# Patient Record
Sex: FEMALE | Race: WHITE | HISPANIC OR LATINO | Employment: UNEMPLOYED
[De-identification: names, ages, dates, MRNs, and addresses within clinical notes are randomized per-mention and may not be internally consistent; named-entity substitution may affect disease eponyms.]

---

## 2024-03-06 ENCOUNTER — TRANSCRIBE ORDERS (OUTPATIENT)
Dept: OTHER | Age: 44
End: 2024-03-06

## 2024-03-06 ENCOUNTER — PRE VISIT (OUTPATIENT)
Dept: OTHER | Age: 44
End: 2024-03-06

## 2024-03-06 ENCOUNTER — PATIENT OUTREACH (OUTPATIENT)
Dept: ONCOLOGY | Facility: CLINIC | Age: 44
End: 2024-03-06
Payer: COMMERCIAL

## 2024-03-06 DIAGNOSIS — N83.8 OVARIAN MASS: Primary | ICD-10-CM

## 2024-03-06 NOTE — TELEPHONE ENCOUNTER
RECORDS STATUS - ALL OTHER DIAGNOSIS      Action March 6, 2024 12:44 PM    Action Taken  Request is faxed to  and [facility] to push images to  PACS.      Imaging Received  March 6, 2024 1:23 PM    Action: Images from  received and resolved to PACS.     Imaging Received  March 6, 2024 3:08 PM    Action: Images from [facility] received and resolved to PACS.     RECORDS RECEIVED FROM: /[facility]   DATE RECEIVED:    NOTES STATUS DETAILS   OFFICE NOTE from referring provider [facility] 2/5/24: Dr. Merissa Patel   OFFICE NOTE from other specialist [facility] 9/21/21: Dr. Kadeem Prado   DISCHARGE REPORT from the ER -  2/29/24: [facility]   OPERATIVE REPORT [facility] 6/14/21:Hysterectomy    MEDICATION LIST [facility]    LABS     PATHOLOGY REPORTS Report in [facility]  6/14/21: [id number] (negative)   ANYTHING RELATED TO DIAGNOSIS CE-  Most recent 3/5/24   IMAGING (NEED IMAGES & REPORT)     CT SCANS (Img req from [facility]) [facility]:  2/29/24: CT Abd Pel    [facility]:  8/11/22, 8/9/22, 4/15/22, 9/1/21, 8/15/21, 6/30/21, 6/22/21, 6/16/21: CT Abd Pel  1/16/21: CT Chest Abd Pel   ULTRASOUND (Img req from [facility]) [facility]:  2/29/24: US Pel    [facility]:  8/11/22, 9/22/21, 3/1/21: US Pel

## 2024-03-06 NOTE — PROGRESS NOTES
"New Patient Hematology / Oncology Nurse Navigator Note     Referral Date: 3/6/24    Referring provider:   Merissa Patel  [place] [address]   Phone: [phone]   Fax: [phone]     Referring Clinic/Organization: [facility]      Referred to: GynOnc    Requested provider (if applicable): First available - did not specify     Evaluation for : complex ovarian mass with h/o of uterine reimplantation       Clinical History (per Nurse review of records provided):    Office Visit with OBGYN at [facility]:  \"A/P- complex left ovarian mass with history of ureter reimplantation that abuts this mass. Patient also has some concerning features on imaging and may be best served by having evaluation and possible surgical intervention by gyn Oncology. Will send patient for tumor markers and referral was placed. Patient has insurance pending at this time which will be successful on April 1st. No clinical evidence of torsion and patient is functioning fine with minimal decrease her quality of life at this time. Long discussion with patient with help of an in-person  discussing the findings on imaging and the potential surgery. \" -- BOOKMARKED  2/29/24 CT Abd/Pelvis:  IMPRESSION:   1.  Cystic mass in the left pelvis may be ovarian in nature. Recommend further evaluation with pelvic ultrasound.   2.  Left ovary is small and lobular with scarring noted. No current hydronephrosis. No renal or ureteral stones.  -- BOOKMARKED  2/29/24 Tumor Markers ( normal at 21)--BOOKMARKED   Pelvic US:  IMPRESSION:    Complex 7.7 cm multilocular cystic lesion in the left ovary with thickened internal septations. Surgical consultation recommended.  -- BOOKMARKED    Clinical Assessment / Barriers to Care (Per Nurse):  Pt lives in [place], [place]. Requires . Per RMD note, patient has insurance pending at this time which will be successful on April 1st.    Records Location: Care " Everywhere     Records Needed:   Records from  per protocol     Additional testing needed prior to consult:   N/A    Referral updates and Plan:   Referral received, chart reviewed by nursing, scheduling instructions updated, patient contacted with  and scheduled with Dr. Drake 4/2/24 (as insurance is effective 4/1).     Akua Alcantar, BSN, RN, PHN, OCN  Hematology/Oncology Nurse Navigator  [facility]  [phone]

## 2024-04-02 ENCOUNTER — ONCOLOGY VISIT (OUTPATIENT)
Dept: ONCOLOGY | Facility: CLINIC | Age: 44
End: 2024-04-02
Attending: OBSTETRICS & GYNECOLOGY
Payer: COMMERCIAL

## 2024-04-02 VITALS
WEIGHT: 153 LBS | RESPIRATION RATE: 16 BRPM | OXYGEN SATURATION: 98 % | HEART RATE: 73 BPM | TEMPERATURE: 98.4 F | SYSTOLIC BLOOD PRESSURE: 138 MMHG | HEIGHT: 60 IN | BODY MASS INDEX: 30.04 KG/M2 | DIASTOLIC BLOOD PRESSURE: 85 MMHG

## 2024-04-02 DIAGNOSIS — N83.8 OVARIAN MASS: Primary | ICD-10-CM

## 2024-04-02 PROCEDURE — G0463 HOSPITAL OUTPT CLINIC VISIT: HCPCS | Performed by: OBSTETRICS & GYNECOLOGY

## 2024-04-02 PROCEDURE — 99204 OFFICE O/P NEW MOD 45 MIN: CPT | Performed by: OBSTETRICS & GYNECOLOGY

## 2024-04-02 RX ORDER — ALBUTEROL SULFATE 90 UG/1
1-2 AEROSOL, METERED RESPIRATORY (INHALATION)
COMMUNITY
Start: 2023-11-14 | End: 2024-11-13

## 2024-04-02 RX ORDER — IBUPROFEN 600 MG/1
600 TABLET, FILM COATED ORAL
COMMUNITY
Start: 2024-02-29

## 2024-04-02 RX ORDER — BISMUTH SUBSALICYLATE 262MG/15ML
524 SUSPENSION, ORAL (FINAL DOSE FORM) ORAL
COMMUNITY

## 2024-04-02 ASSESSMENT — PAIN SCALES - GENERAL: PAINLEVEL: MILD PAIN (2)

## 2024-04-02 NOTE — NURSING NOTE
"Oncology Rooming Note    April 2, 2024 1:09 PM   Bonnie Duong is a 43 year old female who presents for:    Chief Complaint   Patient presents with    Oncology Clinic Visit     RTN for Ovarian MAss     Initial Vitals: /85   Pulse 73   Temp 98.4  F (36.9  C) (Oral)   Resp 16   Ht 1.53 m (5' 0.24\")   Wt 69.4 kg (153 lb)   SpO2 98%   BMI 29.65 kg/m   Estimated body mass index is 29.65 kg/m  as calculated from the following:    Height as of this encounter: 1.53 m (5' 0.24\").    Weight as of this encounter: 69.4 kg (153 lb). Body surface area is 1.72 meters squared.  Mild Pain (2) Comment: Data Unavailable   No LMP recorded.  Allergies reviewed: Yes  Medications reviewed: Yes    Medications: Medication refills not needed today.  Pharmacy name entered into EPIC: Data Unavailable    Frailty Screening:   Is the patient here for a new oncology consult visit in cancer care? 2. No      Clinical concerns: none       No Powell MA             "

## 2024-04-02 NOTE — LETTER
4/2/2024         RE: Bonnie Duong  [address]  [place] [address]        Dear Colleague,    Thank you for referring your patient, Bonnie Duong, to the [facility]. Please see a copy of my visit note below.      GYNECOLOGIC ONCOLOGY  [facility]  [address] [address]  Phone: [phone]  Fax: [phone]     Patient:  Bonnie Duong  YOB: 1980  Date of Visit: 04/02/2024    Referring Provider  Merissa Patel  [facility]    Reason for visit: ovarian cyst    Assessment   Bonnie Duong is a 43 year old pre-menopausal patient with history notable for supra-cervical hysterectomy for fibroids, history of distal ureteral resection now with ovarian cyst likely benign etiology.    Plan  - Review of imaging shows ovarian cyst in the left adnexa in 2022 which was very similar in appearance. It has increased in size since then but not significantly. We reviewed the options including surgical resection or observation. We cannot definitively make a diagnosis without surgery, but given normal tumor markers and reassuring findings on imaging with relative stability over the last 2 years I think surveillance is a reasonable option.   - She would like surveillance at this time. We will repeat pelvic ultrasound in 6-12 mos (whatever works with her schedule is fine). Can have imaging same day as appointment or virtual appointment to review results.   - If she develops new pelvic symptoms, she should be evaluated sooner.     Abdifatah Drake MD   Gynecologic Oncology     ~~~~~~~~~~~~~~~~~~~~~~~~~~~~~~~~~~~~~~~~~~~~~~~~~~~~~~~~~~~~~~~~~~~~~~~    History of Present Illness  Per chart review: history of supracervical hysterectomy for fibroids. Concurrently had distal ureteral resection with reimplantation due to fibrosis from kidney stones by urology. Ultrasound showing left multicystic ovary in  "2022, see below. Normal tumor markers.     Per patient report: Had acute pain and went to ED for evaluation. Pain was in upper left flank. Has not recurred. Notes a lot of stress because she is here alone - her family is in [place]. She is currently looking for employment so is concerned about finances and about any intervention that would prevent her from working.    Colombian  used for the entirety of the visit.     OB/Gynecologic History:  g0  S/p hysterectomy (supracervical)      No past medical history on file.  Past Surgical History:   Procedure Laterality Date     IR CYSTOGRAM  6/18/2021     History reviewed. No pertinent family history.       Physical Exam  /85   Pulse 73   Temp 98.4  F (36.9  C) (Oral)   Resp 16   Ht 1.53 m (5' 0.24\")   Wt 69.4 kg (153 lb)   SpO2 98%   BMI 29.65 kg/m    Gen: no acute distress    Data  Laboratory data and imaging listed below was reviewed prior to this encounter  Labs/Pathology:    2    AFP 2.7    21  Inhibin B 53    Imaging: Reviewed ultrasound images from 2024 and 2022. Both show a multi-cystic structure in the left adnexa with slightly thickened septation. There are no solid areas, papillary excresences or irregularities on the surface. The size has increased by about 2-3cm from 2022 to 2024 in at least one dimension.     I spent a total of 45 minutes on the care of Bonnie Duong on the day of service including face to face time, care coordination, and documentation on the day of service.   {AMBULATORY ATTESTATION (Optional):729102}         Again, thank you for allowing me to participate in the care of your patient.        Sincerely,        Abdifatah Drake MD  "

## 2024-04-03 NOTE — NURSING NOTE
Return appt in    Biopsies orders entered, specimen sent to pathology    pap smear done today, orders entered, specimen sent to cytology   orders entered  Referral to   Check out note/staff message sent to scheduling

## 2025-03-31 ENCOUNTER — ANCILLARY PROCEDURE (OUTPATIENT)
Dept: ULTRASOUND IMAGING | Facility: CLINIC | Age: 45
End: 2025-03-31
Attending: OBSTETRICS & GYNECOLOGY
Payer: COMMERCIAL

## 2025-03-31 DIAGNOSIS — N83.8 OVARIAN MASS: ICD-10-CM

## 2025-03-31 PROCEDURE — 76830 TRANSVAGINAL US NON-OB: CPT | Performed by: RADIOLOGY

## 2025-03-31 PROCEDURE — 76856 US EXAM PELVIC COMPLETE: CPT | Performed by: RADIOLOGY

## 2025-04-10 ENCOUNTER — APPOINTMENT (OUTPATIENT)
Dept: INTERPRETER SERVICES | Facility: CLINIC | Age: 45
End: 2025-04-10
Payer: COMMERCIAL

## 2025-06-29 ENCOUNTER — HEALTH MAINTENANCE LETTER (OUTPATIENT)
Age: 45
End: 2025-06-29

## 2025-07-08 ENCOUNTER — ONCOLOGY VISIT (OUTPATIENT)
Dept: ONCOLOGY | Facility: CLINIC | Age: 45
End: 2025-07-08
Attending: OBSTETRICS & GYNECOLOGY
Payer: COMMERCIAL

## 2025-07-08 VITALS
OXYGEN SATURATION: 98 % | TEMPERATURE: 97.9 F | SYSTOLIC BLOOD PRESSURE: 125 MMHG | BODY MASS INDEX: 26.7 KG/M2 | HEART RATE: 61 BPM | RESPIRATION RATE: 16 BRPM | DIASTOLIC BLOOD PRESSURE: 82 MMHG | WEIGHT: 137.8 LBS

## 2025-07-08 DIAGNOSIS — F32.A DEPRESSION: Primary | ICD-10-CM

## 2025-07-08 PROCEDURE — 99215 OFFICE O/P EST HI 40 MIN: CPT | Performed by: OBSTETRICS & GYNECOLOGY

## 2025-07-08 PROCEDURE — G0463 HOSPITAL OUTPT CLINIC VISIT: HCPCS | Performed by: OBSTETRICS & GYNECOLOGY

## 2025-07-08 RX ORDER — GUAIFENESIN 1200 MG
325-650 TABLET, EXTENDED RELEASE 12 HR ORAL PRN
COMMUNITY

## 2025-07-08 ASSESSMENT — PATIENT HEALTH QUESTIONNAIRE - PHQ9: SUM OF ALL RESPONSES TO PHQ QUESTIONS 1-9: 14

## 2025-07-08 ASSESSMENT — PAIN SCALES - GENERAL: PAINLEVEL_OUTOF10: MODERATE PAIN (5)

## 2025-07-08 NOTE — LETTER
7/8/2025      Bonnie Duong  [address]  [place] [address]      Dear Colleague,    Thank you for referring your patient, Bonnie Duong, to the [facility]. Please see a copy of my visit note below.      GYNECOLOGIC ONCOLOGY  [facility]    Patient:  Bonnie Duong  YOB: 1980  Date of Visit: 07/08/2025    Referring Provider  Merissa Patel MD    Reason for visit: ovarian cyst    Assessment   Bonnie Duong is a 45 year old Thai speaking pre-menopausal patient with history notable for supra-cervical hysterectomy for fibroids, history of distal ureteral resection now with ovarian cyst likely benign etiology. Also with elevated depression screening and self report of depressed mood.     Plan  - Imaging reassuring with decrease in size of cystic lesion and no new areas. This appears benign on my review of imaging and the stability of the lesion with decrease in size supports this.   - Return to general gynecology for routine follow-up. They can consider additional imaging in a year to reassess, but I wouldn't recommend ongoing long term followup of this if she remains asymptomatic.     Depression: based on discussion today and elevated depression screen she does seem to have clinical depression. She has many social stressors and minimal social support. She denies suicidal ideation. She has tried medication in the past and did not like how she felt. She is not interested in trying this again but would like to speak to a psychiatric provider to help with these symptoms.   - Given her insurance, we will contact her PCP office to help with appropraite referral. She likely also would benefit from  involvement but per notes it appears she has not returned calls from their attempts. Likely depression is contributing to her ability to engage with available services.   - Reviewed emergency precautions if she has any  thoughts of self harm.      used for entirety of the visit.     Abdifatah Drake MD   Gynecologic Oncology     ~~~~~~~~~~~~~~~~~~~~~~~~~~~~~~~~~~~~~~~~~~~~~~~~~~~~~~~~~~~~~~~~~~~~~~~    History of Present Illness  No changes from a gyn standpoint.     Feeling very sad and depressed. Stressed about finances and being alone/lack of support. No thoughts of harming herself or others.       Samoan  used for the entirety of the visit.     OB/Gynecologic History:  g0  S/p hysterectomy (supracervical) h/o ureteral resection and re-implantation.     Physical Exam  /82 (BP Location: Right arm, Patient Position: Sitting, Cuff Size: Adult Regular)   Pulse 61   Temp 97.9  F (36.6  C) (Oral)   Resp 16   Wt 62.5 kg (137 lb 12.8 oz)   LMP 02/02/2022 (Approximate)   SpO2 98%   BMI 26.70 kg/m    Gen: appears sad and tearful at times    Data  N/a      Imaging: Reviewed ultrasound images from 2024 and most recent from 3/2025. The lesion has decreased somewhat in size but otherwise stable in apperance for several years. Multi-cystic with thickened septations. No solid areas.     I spent a total of 45 minutes on the care of Bonnie Duong on the day of service including face to face time, care coordination, and documentation on the day of service.          Again, thank you for allowing me to participate in the care of your patient.        Sincerely,        Abdifatah Drake MD    Electronically signed

## 2025-07-08 NOTE — NURSING NOTE
"Oncology Rooming Note    July 8, 2025 3:08 PM   Bonnie Duong is a 45 year old female who presents for:    Chief Complaint   Patient presents with    Oncology Clinic Visit     Ovarian mass     Initial Vitals: /82 (BP Location: Right arm, Patient Position: Sitting, Cuff Size: Adult Regular)   Pulse 61   Temp 97.9  F (36.6  C) (Oral)   Resp 16   Wt 62.5 kg (137 lb 12.8 oz)   LMP 02/02/2022 (Approximate)   SpO2 98%   BMI 26.70 kg/m   Estimated body mass index is 26.7 kg/m  as calculated from the following:    Height as of 4/2/24: 1.53 m (5' 0.24\").    Weight as of this encounter: 62.5 kg (137 lb 12.8 oz). Body surface area is 1.63 meters squared.  Moderate Pain (5) Comment: Data Unavailable   Patient's last menstrual period was 02/02/2022 (approximate).  Allergies reviewed: Yes  Medications reviewed: Yes    Medications: Medication refills not needed today.  Pharmacy name entered into [facility]: [id number] - [address] AT [facility] [address]    Frailty Screening:   Is the patient here for a new oncology consult visit in cancer care? 2. No    PHQ9:  Did this patient require a PHQ9?: Yes   If the patient required a PHQ9 assessment, did the results require a follow up with the Provider/Nurse?: Yes     Patient completed the PHQ-9 assessment for depression and scored >9? Yes  Question 9 on the PHQ-9 was positive for suicidality? No  Does patient have current mental health provider? No  Is this a virtual visit? No    I personally notified the following: clinic nurse                   Clinical concerns: Pt would like to avoid surgery if at all possible.      Akash Zuniga              "

## 2025-07-08 NOTE — PROGRESS NOTES
GYNECOLOGIC ONCOLOGY  [facility]IC    Patient:  Bonnie Duong  YOB: 1980  Date of Visit: 07/08/2025    Referring Provider  Merissa Patel MD    Reason for visit: ovarian cyst    Assessment    Bonnie Duong is a 45 year old Lithuanian speaking pre-menopausal patient with history notable for supra-cervical hysterectomy for fibroids, history of distal ureteral resection now with ovarian cyst likely benign etiology. Also with elevated depression screening and self report of depressed mood.     Plan   - Imaging reassuring with decrease in size of cystic lesion and no new areas. This appears benign on my review of imaging and the stability of the lesion with decrease in size supports this.   - Return to general gynecology for routine follow-up. They can consider additional imaging in a year to reassess, but I wouldn't recommend ongoing long term followup of this if she remains asymptomatic.     Depression: based on discussion today and elevated depression screen she does seem to have clinical depression. She has many social stressors and minimal social support. She denies suicidal ideation. She has tried medication in the past and did not like how she felt. She is not interested in trying this again but would like to speak to a psychiatric provider to help with these symptoms.   - Given her insurance, we will contact her PCP office to help with appropraite referral. She likely also would benefit from SW involvement but per notes it appears she has not returned calls from their attempts. Likely depression is contributing to her ability to engage with available services.   - Reviewed emergency precautions if she has any thoughts of self harm.      used for entirety of the visit.     Abdifatah Drake MD   Gynecologic Oncology     ~~~~~~~~~~~~~~~~~~~~~~~~~~~~~~~~~~~~~~~~~~~~~~~~~~~~~~~~~~~~~~~~~~~~~~~    History of Present Illness   No changes from a gyn  standpoint.     Feeling very sad and depressed. Stressed about finances and being alone/lack of support. No thoughts of harming herself or others.       Yoruba  used for the entirety of the visit.     OB/Gynecologic History:  g0  S/p hysterectomy (supracervical) h/o ureteral resection and re-implantation.     Physical Exam   /82 (BP Location: Right arm, Patient Position: Sitting, Cuff Size: Adult Regular)   Pulse 61   Temp 97.9  F (36.6  C) (Oral)   Resp 16   Wt 62.5 kg (137 lb 12.8 oz)   LMP 02/02/2022 (Approximate)   SpO2 98%   BMI 26.70 kg/m    Gen: appears sad and tearful at times    Data   N/a      Imaging: Reviewed ultrasound images from 2024 and most recent from 3/2025. The lesion has decreased somewhat in size but otherwise stable in apperance for several years. Multi-cystic with thickened septations. No solid areas.     I spent a total of 45 minutes on the care of Bonnie Duong on the day of service including face to face time, care coordination, and documentation on the day of service.

## 2025-07-09 ENCOUNTER — PATIENT OUTREACH (OUTPATIENT)
Dept: CARE COORDINATION | Facility: CLINIC | Age: 45
End: 2025-07-09
Payer: COMMERCIAL

## 2025-07-09 NOTE — PROGRESS NOTES
Social Work - Telephone/MyChart message  [facility]  Data: Dx ovarian cyst  Patient Name: Bonnie Duong  Goes By: Georgeалександр CORONA/Age: 1980 (45 year old)      Referral Source: ANDREW Balbuena    Reason for Referral: mental health resources    Intervention: Left voice message for patient on 2025. With .  Plan:  will await patient's return phone call/message and provide assistance at that time.    MINERVA Garcia, [facility] CenterSW   Adult Oncology - [place]  [phone]  [employer] [place] on    *Please note does not work on .   Support Groups at [facility]: Social Work Services for Cancer Patients ([email or web address])

## 2025-07-09 NOTE — NURSING NOTE
Referral back to gyn for follow-up in 1 year  Referral to pcp for mental health referral  Social work referral

## 2025-07-09 NOTE — PATIENT INSTRUCTIONS
It was a pleasure seeing you in clinic today-please reach out if there are any further questions that arise following today's visit.  During business hours, you may reach me at [phone].  For urgent/emergent questions after business hours, you may reach the on-call Gynecologic Oncology Resident through the [facility]  at [phone].    All normal test results are usually communicated via letter or LiquidSpacehart message.  Abnormal results (those that require a change in the previously discussed plan of care) are usually communicated via a phone call.    I recommend signing up for [employer] access if you have not already done so.  This allows you online access to your lab results and also helps you communicate efficiently with your clinic should any questions arise in your care.    No follow-up in gyn oncology needed  Continue regular visits with primary care and gynecology  Please call if you have any questions or concerns related to your recent surgery or recovery      Dr Naida Staley RN  Phone:  [phone]  Fax:  [phone]

## 2025-07-20 ENCOUNTER — HEALTH MAINTENANCE LETTER (OUTPATIENT)
Age: 45
End: 2025-07-20